# Patient Record
Sex: MALE | Race: WHITE | URBAN - METROPOLITAN AREA
[De-identification: names, ages, dates, MRNs, and addresses within clinical notes are randomized per-mention and may not be internally consistent; named-entity substitution may affect disease eponyms.]

---

## 2021-09-09 ENCOUNTER — OFFICE VISIT (OUTPATIENT)
Dept: FAMILY MEDICINE | Facility: CLINIC | Age: 61
End: 2021-09-09

## 2021-09-09 VITALS
SYSTOLIC BLOOD PRESSURE: 146 MMHG | WEIGHT: 208.7 LBS | HEART RATE: 96 BPM | OXYGEN SATURATION: 97 % | HEIGHT: 68 IN | TEMPERATURE: 97.8 F | DIASTOLIC BLOOD PRESSURE: 79 MMHG | BODY MASS INDEX: 31.63 KG/M2

## 2021-09-09 DIAGNOSIS — N40.0 PROSTATIC ENLARGEMENT: ICD-10-CM

## 2021-09-09 DIAGNOSIS — L03.319 CELLULITIS AND ABSCESS OF TRUNK: Primary | ICD-10-CM

## 2021-09-09 DIAGNOSIS — L02.219 CELLULITIS AND ABSCESS OF TRUNK: Primary | ICD-10-CM

## 2021-09-09 RX ORDER — FLUCONAZOLE 150 MG/1
150 TABLET ORAL ONCE
Qty: 1 TABLET | Refills: 0 | Status: SHIPPED | OUTPATIENT
Start: 2021-09-09 | End: 2021-09-09

## 2021-09-09 RX ORDER — CEPHALEXIN 500 MG/1
500 TABLET ORAL 3 TIMES DAILY
Qty: 15 TABLET | Refills: 0 | Status: SHIPPED | OUTPATIENT
Start: 2021-09-09 | End: 2021-09-14

## 2021-09-09 RX ORDER — CLOTRIMAZOLE 1 %
CREAM (GRAM) TOPICAL 2 TIMES DAILY
Qty: 30 G | Refills: 0 | Status: SHIPPED | OUTPATIENT
Start: 2021-09-09 | End: 2021-09-24

## 2021-09-09 ASSESSMENT — PATIENT HEALTH QUESTIONNAIRE - PHQ9: SUM OF ALL RESPONSES TO PHQ QUESTIONS 1-9: 10

## 2021-09-09 ASSESSMENT — MIFFLIN-ST. JEOR: SCORE: 1731.16

## 2021-09-09 ASSESSMENT — PAIN SCALES - GENERAL: PAINLEVEL: MILD PAIN (2)

## 2021-09-10 NOTE — NURSING NOTE
"BP (!) 146/79 (BP Location: Right arm, Patient Position: Chair, Cuff Size: Adult Regular)   Pulse 96   Temp 97.8  F (36.6  C)   Ht 1.727 m (5' 8\")   Wt 94.7 kg (208 lb 11.2 oz)   SpO2 97%   BMI 31.73 kg/m      BONNIE STARR.  A 61 year old male, presented today with complaint of a boil in the right butt check that has bothered him the past three days. He rates the pain at a 3/10 with itching. Sitting on the sore causes more pain.     On examination it was discovered that boil was 2 cm in diameter, red and slightly raised and that has had a large rash area over most of both buttocks with a defined margin and satellite lesions that Dr. Bautista diagnosed as a yeast/fungus infection.    We discussed that he would be prescribed a clotrimazole cream and on oral 150 tab of fluconazole for the fungal infection and an antibiotic for the boil.     In addition during huddle it was decided that the TF would be encouraged to take his blood pressure more regularly this next month and report back on the results in a follow up in a month to determine if TF does has hypertension.    Student Nurse: Oliva Benitez MN2  "

## 2021-09-10 NOTE — PROGRESS NOTES
"MEDICINE NOTE    SUBJECTIVE:  Flaquito Seals is a 61 year old male with a past medical history of Cellulitis who presents to the clinic for evaluation of skin infection localized to the left buttock that developed 2-3 days ago. Described as an \" annoying, itchy, large pimple\" associated with intermittent soreness and throbbing pain. Pain worsens when sitting down and is relieved with the use of hot compresses. Rated a 3/10 in severity. Current symptoms are similar to the symptoms he experienced 1.5 years ago when diagnosed with cellulitis on his left calf. At that time, patient was given a 2 week course of antibiotics for treatment. He states his apartment complex had an infestation of unspecified bugs however his unit was exterminated in the spring. Denies fever and chills. Patient concerned for cellulitis.    REVIEW OF SYSTEMS:  Gen: no fevers, night sweats or weight change  Eyes: no vision change, diplopia or red eyes  Ears, Noses, Mouth, Throat: Nasal discharge and congestion. No ringing in ears or hearing change, no epistaxis, no oral lesions, throat clear  Cardiac: no chest pain, palpitations, or pain with walking  Lungs: Cough attributed to allergies. No dyspnea or shortness of breath  GI: no nausea, vomiting, diarrhea or constipation, no abdominal pain  : Urinary frequency, no hematuria, or sexual dysfunction  Musculoskeletal: no joint or muscle pain or swelling  Skin: See HPI above.   Neuro: no loss of strength or sensation, no numbness or tingling, no tremor  Endo: no polyuria or polydipsia, no temperature intolerance  Heme/Lymph: no concerning bumps, no bleeding problems  Allergy: no environmental or drug allergies  Psych: Depression and anxiety.    PAST MEDICAL HISTORY:   1. Asthma  2. Depression  3. Anxiety  4. Enlarged Prostate  5. Arthritis  6. Cellulitis    PAST SURGICAL HISTORY: None.     MEDICATIONS:    1. Albuterol inhaler (dose unknown) as needed for asthma   2. Qvar inhaler (dose unknown) " "daily for asthma   3. Tylenol PO (dose unknown) as needed.    ALLERGIES: NKDA    FAMILY HISTORY:  Father: Testicular Cancer, CVA, Arthritis  Mother: No significant medical history.     SOCIAL HISTORY:   Patient lives by himself approximately 2 miles away from the clinic in [facility]. Uses public transportation to get around. Denies having a partner or children.     Caffeine: 2-3 cups of coffee/day.   Tobacco: Denies tobacco use.   Alcohol: Drinks 4-5 ans of beer/week.   Drugs: Denies recreational drug use.     OBJECTIVE:  Physical Exam:  BP (!) 146/79 (BP Location: Right arm, Patient Position: Chair, Cuff Size: Adult Regular)   Pulse 96   Temp 97.8  F (36.6  C)   Ht 1.727 m (5' 8\")   Wt 94.7 kg (208 lb 11.2 oz)   SpO2 97%   BMI 31.73 kg/m    Constitutional: Elderly, obese, no acute distress, ambulatory, slightly disheveled   Eyes: anicteric, normal extra-ocular movements   Ears, Nose and Throat: tympanic membranes clear, nose clear and free of lesions, throat clear, neck supple with full range of motion, no thyromegaly.   Cardiovascular: regular rate and rhythm, normal S1 and S2, heart sounds distant with no murmurs   Respiratory: clear to auscultation, no wheezes or crackles, normal breath sounds    Musculoskeletal: full range of motion, no edema   Skin: Well circumscribed red rash with central erythema on bilateral buttock with healing, slightly raised 2 x 2 cm boil in the center.     Neurological: cranial nerves intact, normal strength and sensation, reflexes at patella and biceps normal, normal gait, no tremor   Psychological: appropriate mood   Lymphatic: no cervical  lymphadenopathy    ASSESSMENT/PLAN:  Flaquito Seals is a 61 year old male with a past medical history of Cellulitis who presents to the clinic for evaluation of a pruritic boil on the left buttock for 2-3 days associated with a well circumscribed red rash with central erythema on examination.     Patient advised on daily blood " pressure monitoring as blood pressure noted to be elevated at 153/84 mmHg.     Discussed with patient the likely diagnosis of a bacterial infection (Staph A. vs. Streptococci) as well as a fungal infection.     Patient provided with cephalexin 500 mg tablet PO for 5 days, one dose of fluconazole 150 mg tablet, and clotrimazole 1% topical cream for 15 days.    Repeat BP: 146/79 mmHg.     Shahab was seen today for lesion.    Diagnoses and all orders for this visit:    Cellulitis and abscess of trunk  -     cephalexin 500 MG tablet; Take 500 mg by mouth 3 times daily for 5 days  -     fluconazole (DIFLUCAN) 150 MG tablet; Take 1 tablet (150 mg) by mouth once for 1 dose  -     clotrimazole (LOTRIMIN) 1 % external cream; Apply topically 2 times daily for 15 days        Med Clinician: Fartun Castillo MS2  Preceptor: Stefano Bautista MD    In supervising the medical student, Fartun Castillo, I repeated the exam documented above. Patient with cellulitis originating from boil on r buttox.  Also  Cutaneous rash over both buttox consistent with yeast.  Short course antibiotics, oral / cutaeous tx for yeast. I have reviewed and verified the student s documentation.  Supervising Provider: Stefano Bautista MD

## 2021-09-10 NOTE — PATIENT INSTRUCTIONS
1. Take one tablet of Cefalexin 500 mg 3 times daily for 5 days  2. Take one tablet of Fluconozole 150 mg   3. Apply Cotrimoxazole 1% topical cream to affected area twice daily

## 2021-09-13 ENCOUNTER — TELEPHONE (OUTPATIENT)
Dept: FAMILY MEDICINE | Facility: CLINIC | Age: 61
End: 2021-09-13

## 2021-09-13 NOTE — TELEPHONE ENCOUNTER
----- Message from Citlali Nash sent at 2021 10:08 PM CDT -----  Regardin2021, English  Summary: Shahab presented to the clinic 21 with a bacterial and fungal infection on his buttock. He was prescribed 15 cephalexin 500mg tablets, taking it TID for 5 days; 1 fluconazole 150mg tablet, and clotrimazole 1% cream.    Plan:  1. Initiate the cephalexin, fluconazole, and clotrimazole for the infections  2. Soak abscess in warm water daily  3. Monitor infection for any changes, if there is no improvement in 1 week, return to [facility]    What to monitor for:  Cephalexin:  -Efficacy: decreased size of abscess and erythema around it   -Safety: adverse effects include diarrhea, allergic reaction    Fluconazole  -Efficacy: decreased redness and bumps of fungal rash  -Safety: monitor for headache after dose    Clotrimazole  -Efficacy: decreased redness and bumps of fungal rash  -Safety: skin irritation

## 2021-09-13 NOTE — TELEPHONE ENCOUNTER
----- Message from Citlali Nash sent at 2021 10:08 PM CDT -----  Regardin2021, English  Summary: Shahab presented to the clinic 21 with a bacterial and fungal infection on his buttock. He was prescribed 15 cephalexin 500mg tablets, taking it TID for 5 days; 1 fluconazole 150mg tablet, and clotrimazole 1% cream.    Plan:  1. Initiate the cephalexin, fluconazole, and clotrimazole for the infections  2. Soak abscess in warm water daily  3. Monitor infection for any changes, if there is no improvement in 1 week, return to [facility]    What to monitor for:  Cephalexin:  -Efficacy: decreased size of abscess and erythema around it   -Safety: adverse effects include diarrhea, allergic reaction    Fluconazole  -Efficacy: decreased redness and bumps of fungal rash  -Safety: monitor for headache after dose    Clotrimazole  -Efficacy: decreased redness and bumps of fungal rash  -Safety: skin irritation

## 2021-11-01 ENCOUNTER — OFFICE VISIT (OUTPATIENT)
Dept: FAMILY MEDICINE | Facility: CLINIC | Age: 61
End: 2021-11-01

## 2021-11-01 VITALS
HEIGHT: 68 IN | TEMPERATURE: 98.1 F | HEART RATE: 84 BPM | SYSTOLIC BLOOD PRESSURE: 127 MMHG | BODY MASS INDEX: 32.28 KG/M2 | WEIGHT: 213 LBS | OXYGEN SATURATION: 97 % | RESPIRATION RATE: 16 BRPM | DIASTOLIC BLOOD PRESSURE: 75 MMHG

## 2021-11-01 DIAGNOSIS — M19.90 ARTHRITIS: ICD-10-CM

## 2021-11-01 DIAGNOSIS — M16.10 ARTHRITIS OF HIP: Primary | ICD-10-CM

## 2021-11-01 RX ORDER — ACETAMINOPHEN 500 MG
500-1000 TABLET ORAL EVERY 6 HOURS PRN
Qty: 240 TABLET | Refills: 0 | Status: SHIPPED | OUTPATIENT
Start: 2021-11-01 | End: 2021-12-01

## 2021-11-01 RX ORDER — IBUPROFEN 200 MG
600 TABLET ORAL EVERY 8 HOURS PRN
Qty: 270 TABLET | Refills: 0 | Status: SHIPPED | OUTPATIENT
Start: 2021-11-01

## 2021-11-01 ASSESSMENT — MIFFLIN-ST. JEOR: SCORE: 1750.66

## 2021-11-01 ASSESSMENT — PAIN SCALES - GENERAL: PAINLEVEL: MILD PAIN (3)

## 2021-11-02 NOTE — PATIENT INSTRUCTIONS
We believe that your pain is stemming from your previously diagnosed arthritis. The icy hot and tylenol have been temporarily helping you, but physical therapy would be a more permanent solution.    We will be prescribing you 500 mg tylenol tablets. You are able to take up to 4000 mg a day, but no more than two pills every six hours as needed.    We have given you a Physical Therapy fast pass, and we recommend you come back any Monday or Thursday between 6 PM - 8 PM to receive treatment.

## 2021-11-02 NOTE — PROGRESS NOTES
"MEDICINE NOTE    SUBJECTIVE:  Mr. Shahab Seals is a 61 year old male who presents to clinic for right shoulder and left hip pain. His left hip pain started last month, described as stiff and sharp pain in the mornings that improves throughout the day. It gets worse with L hip flexion and is a bit better while walking. He sleeps in an EZ chair at night and notes the pain sometimes wakes him up and night. Right shoulder pain began last year and is most noticeable while the R shoulder and elbow are both flexed, like a \"chicken wing\". Both areas of pain are rated a 5/10 at their worst, and do not radiate anywhere else on his body. He takes one 500 mg tablet of Tylenol 4x daily to help with the pain when needed and also uses Icy Hot. He notes he has a 15 year history of arthritis and has taken a few falls, the most recent being last April, but that he does not know if the falls specifically triggered the current  pain given timing of their onset. He denies any fever chills, weight loss, or numbness/tingling in extremities.    REVIEW OF SYSTEMS:  Gen: no fevers, chills, night sweats or weight change  Musculoskeletal: shoulder and hip pain (see HPI), ongoing hx of arthritis  Skin: no concerning lesions or rashes  Neuro: no dizziness, numbness, or tingling    Past Medical History:   Diagnosis Date     Arthritis      Asthma      Depression      Fainting spell 5/9/14     Prostatic enlargement 9/9/2021     Syncope, unspecified syncope type 8/30/2015       No past surgical history on file.    No family history on file.    Social History     Socioeconomic History     Marital status: Single     Spouse name: Not on file     Number of children: Not on file     Years of education: Not on file     Highest education level: Not on file   Occupational History     Not on file   Tobacco Use     Smoking status: Never Smoker     Smokeless tobacco: Never Used   Substance and Sexual Activity     Alcohol use: Yes     Alcohol/week: 4.0 " standard drinks     Types: 4 Cans of beer per week     Comment: 2-3 drinks 3 times per week     Drug use: No     Sexual activity: Never   Other Topics Concern     Parent/sibling w/ CABG, MI or angioplasty before 65F 55M? Not Asked   Social History Narrative    Date of Service:10/10/2012     Name: Shahab MAYES (Month, Day, Year of birth): 1960     MRN: [id number]     New Patient: No    Preferred Language: English     Needed: No    County of Residence: [place]    Marital Status: Single    Household size: 1    Number of Dependents: 0    Pregnant: N/A    Average Monthly Income: $1,100-$1,800 per month    Citizenship Status: [place] citizen            Applied for AA about 6 months ago, will give them another call. Unemployed, benefits ran out in September. Was getting $800 before. Not interested in [place] [facility] due to application length. -TP.         MTD    Still no report from AA. This time, took form for [facility], interested. Given info for food assistance. Utilities are covered by [employer]. Would be interested in rental assistance...        13 Patient followed up with AA a couple weeks ago but still no response. Did not end up filling out the [place] [facility] application because he felt it was too long with too much paperwork. Not interested in receiving another [place] [facility] application. Will follow up with AA again soon. SSJ        13: He still hasn't heard back from assured access and he said that he was going to try and follow-up again. We are going to email the CHW coordinator and receive more specific information about how long it takes for patients to hear if they are accepted or not. Also, make sure the numbers we are giving the patients to follow-up with AA is correct. -KLB        4/10/13    Patient received a [place] to take [place] to fill out. He still has not heard back from assured access and he will try to give them a call again. Patient may fill out another assured access  application if he did not receive any response in the future.-         -    Still waiting to hear back.         13    Patient still has not heard back from AA -- says it's been about 6 months. Did not end up completing his [place] application because of the amount of paperwork involved. I advised that he tackle a page a day, or bring in his application materials the next time he's here for a med re-fill so that a CHW can fill out the form with him. He seemed somewhat open to this idea. One concern he had with the [place] [facility] application was that his employment is sporadic; he works 35 hours/week now but won't have employment from July until November. He also received the AA number so he can try calling again to inquire about his application status. [facility]        Date of Service:2013     Name: Shahab MAYES (Month, Day, Year of birth): 1960     MRN: [id number]     New Patient: No    Preferred Language: English     Needed: No    County of Residence: [place]    Marital Status: Single    Household size: 1    Number of Dependents: 0    Pregnant:No    Average Monthly Income: $ 0    Citizenship Status: [place] Citizen    2013: He has still not contacted AA access about his application status because he got side tracked.  I gave him the phone number and another form just in case.        13. Patient still has not contacted AA because he has been distracted. He requested Rental Assistance resources and since I could not find the form (I think the binder is being updated), I found a list of resources online and gave them to him. [facility]        10/2/2013    Hasn't called AA but is planning on calling them sometime soon        2013:     Hasn't called AA. CHW gave him the AA number. He has not utilized any of the Rental Assistance resources given to him on 13 but he still has the form in case he needs rental assistance later. [facility]        2013- Gave patient AA forms again to fill  out. He still has Rental Assistance resources but isn't sure if he is eligible anymore because he has spontaneous income. ES        12/30/2013    No need to see CHW        3/3/14    Did not need to see CHW. EAL        5/12/2014        The patient stated that he recently had a trip to the emergency department and is looking to get insurance to help cover these costs. I gave him the website to [facility] and gave him an application to look at but told him that he should print an application from the website. Please check with him in the future to see if he applied and how the status of his application. HT        6/11/2014        10/29/2015    The patient now has insurance through [employer]. He requested information on dental services and we referred him to [facility]. He had no other questions. Follow up on dental status for next visit.         12/10/2015    Followed up on dental status, has not gone to dental clinic since the last time he was here. Did not have any additional questions for CHW.        2/15/2016    The patient did not have any concerns. He is still covered under [employer] and is currently employment. -TJ        11/1/2021    Patient mentioned that they had insurance but was not paying premiums. Provided information about [facility] for dental, primary care. Mentioned that they were okay with sliding scale. -CO                 Social Determinants of Health     Financial Resource Strain:      Difficulty of Paying Living Expenses:    Food Insecurity:      Worried About Running Out of Food in the Last Year:      Ran Out of Food in the Last Year:    Transportation Needs:      Lack of Transportation (Medical):      Lack of Transportation (Non-Medical):    Physical Activity:      Days of Exercise per Week:      Minutes of Exercise per Session:    Stress:      Feeling of Stress :    Social Connections:      Frequency of Communication with Friends and Family:      Frequency of Social Gatherings with Friends  "and Family:      Attends Yazidi Services:      Active Member of Clubs or Organizations:      Attends Club or Organization Meetings:      Marital Status:    Intimate Partner Violence:      Fear of Current or Ex-Partner:      Emotionally Abused:      Physically Abused:      Sexually Abused:        OBJECTIVE:  Physical Exam:  /75 (BP Location: Right arm, Patient Position: Sitting)   Pulse 84   Temp 98.1  F (36.7  C) (Oral)   Resp 16   Ht 1.727 m (5' 8\")   Wt 96.6 kg (213 lb)   SpO2 97%   BMI 32.39 kg/m    Constitutional: no distress, comfortable, pleasant   Musculoskeletal: full range of motion, no paraspinal or midline tenderness, positive empty can on R arm, negative on L arm  Neurological: strength and sensation intact  Psychological: appropriate mood     ASSESSMENT/PLAN:  Shahab was seen today for one month of L hip pain and one year of R shoulder pain. Given his 15 year history of arthritis and lack of infectious or neurological symptoms, it is most likely that his hip and shoulder pain are also arthritic. Plan is to prescribe acetaminophen and ibuprofen PRN, and begin physical therapy for muscle strengthening. Physical Therapy Fast Pass was given to patient.    Diagnoses and all orders for this visit:    Arthritis of hip  -     acetaminophen (TYLENOL) 500 MG tablet; Take 1-2 tablets (500-1,000 mg) by mouth every 6 hours as needed for mild pain (pain)  -     ibuprofen (ADVIL/MOTRIN) 200 MG tablet; Take 3 tablets (600 mg) by mouth every 8 hours as needed for mild pain    Arthritis  -     acetaminophen (TYLENOL) 500 MG tablet; Take 1-2 tablets (500-1,000 mg) by mouth every 6 hours as needed for mild pain (pain)  -     ibuprofen (ADVIL/MOTRIN) 200 MG tablet; Take 3 tablets (600 mg) by mouth every 8 hours as needed for mild pain    Asthma        Med Clinician: Beatriz Palmer  Preceptor: Orion Rachel    I am signing this for the preceptor who has been unable to sign this note in a timely manner.  " The content of this note has been reviewed by the preceptor for accuracy.  I did not participate in the care of this patient.  Ilan London MD - Medical Director, [facility]

## 2021-11-02 NOTE — PROGRESS NOTES
"[facility] Progress Note    Chief complaint: Pain of right shoulder and left hip    Last date of full med: 11/1/2021    Subjective:  Patient reports pain of the right shoulder and the left hip. Pain in the right shoulder is only present when the elbow is bent. The pain does not radiate and there is no numbness or tingling. Patient states that pain is sharp and at worst a pain scale of 5/10. Average pain scale of 2-3/10. The pain is worst in the morning but gets better throughout the day and with moving. Patient reports using Icy Hot and that helps the pain occasionally. Patient diagnosed with arthritis 15 years ago. Pain tonight is consistent with arthritis, physical assessment was done.       Current Outpatient Medications   Medication Sig Dispense Refill     ibuprofen (ADVIL/MOTRIN) 200 MG tablet Take 3 tablets (600 mg) by mouth every 8 hours as needed for mild pain 270 tablet 0     acetaminophen (TYLENOL) 500 MG tablet Take 1-2 tablets (500-1,000 mg) by mouth every 6 hours as needed for mild pain (pain) 240 tablet 0         Objective:  /75 (BP Location: Right arm, Patient Position: Sitting)   Pulse 84   Temp 98.1  F (36.7  C) (Oral)   Resp 16   Ht 1.727 m (5' 8\")   Wt 96.6 kg (213 lb)   SpO2 97%   BMI 32.39 kg/m        Assessment:     Indication  DTP: Needs additional drug therapy  Rationale: Patient needs additional drug therapy due to untreated hip and shoulder pain      Indication  DTP: Needs additional drug therapy  Rationale: Patient needs additional drug therapy for untreated hip and shoulder pain.      Plan:  1. Patient will take 1-2 tablets of tylenol when pain occurs and 3 tablets of ibuprofen for any breakthrough pain.  2. Patient will continue to use Icy Hot to help with pain.  3. Patient was given PT fast pass to use to strengthen muscle weakness and improve joint stability.    Follow-Up:  Patient will follow up in person within the next two weeks and use PT fast pass to get an " assessment and exercises to decrease shoulder and hip pain. Will also assess IESC of tylenol and ibuprofen. Make sure that they are controlling the pain without side effects such as upset stomach, itching, nausea, or black/bloody stools.    Pharmacy Clinician: Suki Hernandez  Pharmacy Preceptor: Julio Cesar Camejo Pharm.D.      _____________________________  Preceptor Use Only:  In supervising the student, I have reviewed and verified the student's documentation and found it to be correct and complete.   Preceptor Signature: Julio Cesar Camejo Pharm.D.

## 2021-11-02 NOTE — PROGRESS NOTES
"[facility] Nursing Progress Note    Chief complaint: Hip and shoulder pain      Onset: hip pain started in last month, shoulder pain started in last year  Location: hip (left outer/inner), shoulder (right upper, with movement)  Duration, timing: shoulder (hurts with movement and bending), hip (ok with walking but painful in the morning).  Severity (0-10): Shoulder 4/10 at worst, hip 3/10 while walking and 5/10 in the morning.    Other: patient had a few falls in the last year which he attributes to not watching where he was going, thinks the shoulder pain may be associated with the fall, not sure where hip pain is from. Possibly arthritis which he has had for the past 15 years, taking tylenol and applying icy/hot.  Patient would like to get a fastpass for PT to come on another night, not sure when he can come back.      Objective:  /75 (BP Location: Right arm, Patient Position: Sitting)   Pulse 84   Temp 98.1  F (36.7  C) (Oral)   Resp 16   Ht 1.727 m (5' 8\")   Wt 96.6 kg (213 lb)   SpO2 97%   BMI 32.39 kg/m        Social History:  Caffeine: about 3 cups of coffee per day  Tobacco: no  Alcohol: yes- 6 drinks/week. 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  Physical Activity/Lifestyle: try to walk to the grocery store and around to keep his hips moving.      PHQ Score:    PHQ2: 2     PHQ9: n/a      Nursing Clinician: Alessandra Navarro  Nursing Preceptor: Ame Kiser NP    _____________________________  Preceptor Use Only:  In supervising the student, I have reviewed and verified the student's documentation and found it to be correct and complete.   Preceptor Signature: Ame Kiser NP  " Family Declined

## 2022-01-09 NOTE — PROGRESS NOTES
[facility] Progress Note    Chief complaint: Possible skin infection    Last date of full med: 2/15/2016    Subjective:  Shahab is a 61 year old man that presented to the clinic inquiring about a possible skin infection on his left buttock. He has a history of cellulitis and states that this infection looks similar to ones he has had in the past. Shahab notes that a red, inflamed pimple appeared on his skin 2-3 days ago. He has been soaking in warm water daily, and has not been able to drain the abscess. While examining the skin infection, it was also determined that Shahab had a fungal infection around his buttock. Shahab has no known drug allergies. He has a history of cellulitis, asthma, depression, anxiety, and BPH, but does not take medications for these anymore due to losing his medical insurance. He was previously taking Sertraline 100mg daily, Qvar 80mcg/ ACT, Ventolin, and Tamsulosin 0.4mg. He states that he occasionally takes acetaminophen for minor ailments. He drinks about 2-3 cups of coffee daily and about 4-5 beers per week. No tobacco or illicit drug use. Shahab received his COVID-19 and flu vaccine this year.       Current Outpatient Medications   Medication Sig Dispense Refill     acetaminophen (TYLENOL) 500 MG tablet Take 1 tablet (500 mg) by mouth every 6 hours as needed for pain 90 tablet 0     cephalexin 500 MG tablet Take 500 mg by mouth 3 times daily for 5 days 15 tablet 0     clotrimazole (LOTRIMIN) 1 % external cream Apply topically 2 times daily for 15 days 30 g 0     albuterol (PROAIR HFA, PROVENTIL HFA, VENTOLIN HFA) 108 (90 BASE) MCG/ACT inhaler Inhale 2 puffs into the lungs every 6 hours as needed for shortness of breath / dyspnea (Patient not taking: Reported on 9/9/2021) 1 Inhaler 0     beclomethasone (QVAR) 80 MCG/ACT Inhaler Inhale 2 puffs into the lungs 2 times daily (Patient not taking: Reported on 9/9/2021) 1 Inhaler 0     sertraline (ZOLOFT) 100 MG tablet Take one  "tablet (100 mg) by mouth daily (Patient not taking: Reported on 9/9/2021) 30 tablet 0   note: patient is no longer taking Sertraline, Ventolin, and Qvar.      Objective:  BP (!) 146/79 (BP Location: Right arm, Patient Position: Chair, Cuff Size: Adult Regular)   Pulse 96   Temp 97.8  F (36.6  C)   Ht 1.727 m (5' 8\")   Wt 94.7 kg (208 lb 11.2 oz)   SpO2 97%   BMI 31.73 kg/m          Assessment:     Condition: Skin infection  DTP: Needs additional drug therapies  Rationale: Due to his bacterial and fungal infection on his skin, an antibiotic and antifungal medication are indicated.      Plan:  1. Initiate cephalexin 500mg tablets by mouth TID for 5 days  2. Initiate fluconazole 150mg mg by mouth for 1 dose  3. Initiate topical Lotrimin 1% cream, apply to affected area twice daily      Follow-Up:  Monitor the abscess size and color, as well as the fungal rash. If the infection does not improve in the next week, return to the clinic.     Pharmacy Clinician: Citlali Nash  Pharmacy Preceptor: Maggie Villalta    ____________________________  Preceptor Use Only:  In supervising the student, I have reviewed and verified the student's documentation and found it to be correct and complete.  Preceptor Signature: Maggie Villalta, PharmD - September 13, 2021      " 130

## 2023-09-08 ENCOUNTER — LAB REQUISITION (OUTPATIENT)
Dept: LAB | Facility: CLINIC | Age: 63
End: 2023-09-08
Payer: COMMERCIAL

## 2023-09-08 DIAGNOSIS — R19.8 OTHER SPECIFIED SYMPTOMS AND SIGNS INVOLVING THE DIGESTIVE SYSTEM AND ABDOMEN: ICD-10-CM

## 2023-09-08 PROCEDURE — 80053 COMPREHEN METABOLIC PANEL: CPT | Mod: ORL | Performed by: NURSE PRACTITIONER

## 2023-09-08 PROCEDURE — 87209 SMEAR COMPLEX STAIN: CPT | Mod: ORL | Performed by: NURSE PRACTITIONER

## 2023-09-08 PROCEDURE — 87507 IADNA-DNA/RNA PROBE TQ 12-25: CPT | Mod: ORL | Performed by: NURSE PRACTITIONER

## 2023-09-09 LAB
ADV 40+41 DNA STL QL NAA+NON-PROBE: NEGATIVE
ALBUMIN SERPL BCG-MCNC: 4.7 G/DL (ref 3.5–5.2)
ALP SERPL-CCNC: 44 U/L (ref 40–129)
ALT SERPL W P-5'-P-CCNC: 16 U/L (ref 0–70)
ANION GAP SERPL CALCULATED.3IONS-SCNC: 14 MMOL/L (ref 7–15)
AST SERPL W P-5'-P-CCNC: 21 U/L (ref 0–45)
ASTRO TYP 1-8 RNA STL QL NAA+NON-PROBE: NEGATIVE
BILIRUB SERPL-MCNC: 0.6 MG/DL
BUN SERPL-MCNC: 11 MG/DL (ref 8–23)
C CAYETANENSIS DNA STL QL NAA+NON-PROBE: NEGATIVE
CALCIUM SERPL-MCNC: 9.7 MG/DL (ref 8.8–10.2)
CAMPYLOBACTER DNA SPEC NAA+PROBE: NEGATIVE
CHLORIDE SERPL-SCNC: 101 MMOL/L (ref 98–107)
CREAT SERPL-MCNC: 0.78 MG/DL (ref 0.67–1.17)
CRYPTOSP DNA STL QL NAA+NON-PROBE: NEGATIVE
DEPRECATED HCO3 PLAS-SCNC: 23 MMOL/L (ref 22–29)
E COLI O157 DNA STL QL NAA+NON-PROBE: NORMAL
E HISTOLYT DNA STL QL NAA+NON-PROBE: NEGATIVE
EAEC ASTA GENE ISLT QL NAA+PROBE: NEGATIVE
EC STX1+STX2 GENES STL QL NAA+NON-PROBE: NEGATIVE
EGFRCR SERPLBLD CKD-EPI 2021: >90 ML/MIN/1.73M2
EPEC EAE GENE STL QL NAA+NON-PROBE: NEGATIVE
ETEC LTA+ST1A+ST1B TOX ST NAA+NON-PROBE: NEGATIVE
G LAMBLIA DNA STL QL NAA+NON-PROBE: NEGATIVE
GLUCOSE SERPL-MCNC: 82 MG/DL (ref 70–99)
NOROVIRUS GI+II RNA STL QL NAA+NON-PROBE: NEGATIVE
P SHIGELLOIDES DNA STL QL NAA+NON-PROBE: NEGATIVE
POTASSIUM SERPL-SCNC: 4.3 MMOL/L (ref 3.4–5.3)
PROT SERPL-MCNC: 7.5 G/DL (ref 6.4–8.3)
RVA RNA STL QL NAA+NON-PROBE: NEGATIVE
SALMONELLA SP RPOD STL QL NAA+PROBE: NEGATIVE
SAPO I+II+IV+V RNA STL QL NAA+NON-PROBE: NEGATIVE
SHIGELLA SP+EIEC IPAH ST NAA+NON-PROBE: NEGATIVE
SODIUM SERPL-SCNC: 138 MMOL/L (ref 136–145)
V CHOLERAE DNA SPEC QL NAA+PROBE: NEGATIVE
VIBRIO DNA SPEC NAA+PROBE: NEGATIVE
Y ENTEROCOL DNA STL QL NAA+PROBE: NEGATIVE

## 2023-09-11 ENCOUNTER — TRANSCRIBE ORDERS (OUTPATIENT)
Dept: GASTROENTEROLOGY | Facility: CLINIC | Age: 63
End: 2023-09-11
Payer: COMMERCIAL

## 2023-09-11 DIAGNOSIS — R19.8 FULLNESS OF ABDOMEN: Primary | ICD-10-CM

## 2023-09-11 LAB — O+P STL MICRO: NEGATIVE

## 2023-09-12 ENCOUNTER — LAB REQUISITION (OUTPATIENT)
Dept: LAB | Facility: CLINIC | Age: 63
End: 2023-09-12
Payer: COMMERCIAL

## 2023-09-12 DIAGNOSIS — R19.8 OTHER SPECIFIED SYMPTOMS AND SIGNS INVOLVING THE DIGESTIVE SYSTEM AND ABDOMEN: ICD-10-CM

## 2023-09-12 PROCEDURE — 87209 SMEAR COMPLEX STAIN: CPT | Mod: ORL | Performed by: NURSE PRACTITIONER

## 2023-09-13 LAB — O+P STL MICRO: NEGATIVE

## 2023-09-20 ENCOUNTER — TELEPHONE (OUTPATIENT)
Dept: GASTROENTEROLOGY | Facility: CLINIC | Age: 63
End: 2023-09-20
Payer: COMMERCIAL

## 2023-09-20 NOTE — TELEPHONE ENCOUNTER
"Endoscopy Scheduling Screen    Have you had a positive Covid test in the last 14 days?  No    Are you active on MyChart?   No    What insurance is in the chart?  Other:  [employer]    Ordering/Referring Provider: IRAM TIPTON   (If ordering provider performs procedure, schedule with ordering provider unless otherwise instructed. )    BMI: Estimated body mass index is 32.39 kg/m  as calculated from the following:    Height as of 11/1/21: 1.727 m (5' 8\").    Weight as of 11/1/21: 96.6 kg (213 lb).     Sedation Ordered  moderate sedation.   If patient BMI > 50 do not schedule in [facility].    If patient BMI > 45 do not schedule at [facility].    Are you taking methadone or Suboxone?  No    Are you taking any prescription medications for pain 3 or more times per week?   No    Do you have a history of malignant hyperthermia or adverse reaction to anesthesia?  No    (Females) Are you currently pregnant?   No     Have you been diagnosed or told you have pulmonary hypertension?   No    Do you have an LVAD?  No    Have you been told you have moderate to severe sleep apnea?  No    Have you been told you have COPD, asthma, or any other lung disease?  Yes     What breathing problems do you have?  Asthma     Do you use home oxygen?  No    Have your breathing problems required an ED visit or hospitalization in the last year?  No    Do you have any heart conditions?  No     Have you ever had an organ transplant?   No    Have you ever had or are you awaiting a heart or lung transplant?   No    Have you had a stroke or transient ischemic attack (TIA aka \"mini stroke\" in the last 6 months?   No    Have you been diagnosed with or been told you have cirrhosis of the liver?   No    Are you currently on dialysis?   No    Do you need assistance transferring?   No    BMI: Estimated body mass index is 32.39 kg/m  as calculated from the following:    Height as of 11/1/21: 1.727 m (5' 8\").    Weight as of 11/1/21: 96.6 kg (213 lb).     Is patients BMI > " 40 and scheduling location [facility]?  No    Do you take an injectable medication for weight loss or diabetes (excluding insulin)?  No    Do you take the medication Naltrexone?  No    Do you take blood thinners?  No       Prep   Are you currently on dialysis or do you have chronic kidney disease?  No    Do you have a diagnosis of diabetes?  No    Do you have a diagnosis of cystic fibrosis (CF)?  No    On a regular basis do you go 3 -5 days between bowel movements?  No    BMI > 40?  No    Preferred Pharmacy:  No Pharmacies Listed    Final Scheduling Details   Colonoscopy prep sent?  Standard MiraLAX    Procedure scheduled  Colonoscopy    Surgeon:  BERNARD     Date of procedure:  10/9/23     Pre-OP / PAC:   No - Not required for this site.    Location  [facility] - Per order.    Sedation   Moderate Sedation - Per order.      Patient Reminders:   You will receive a call from a Nurse to review instructions and health history.  This assessment must be completed prior to your procedure.  Failure to complete the Nurse assessment may result in the procedure being cancelled.      On the day of your procedure, please designate an adult(s) who can drive you home stay with you for the next 24 hours. The medicines used in the exam will make you sleepy. You will not be able to drive.      You cannot take public transportation, ride share services, or non-medical taxi service without a responsible caregiver.  Medical transport services are allowed with the requirement that a responsible caregiver will receive you at your destination.  We require that drivers and caregivers are confirmed prior to your procedure.

## 2023-09-26 ENCOUNTER — TELEPHONE (OUTPATIENT)
Dept: GASTROENTEROLOGY | Facility: CLINIC | Age: 63
End: 2023-09-26
Payer: COMMERCIAL

## 2023-09-26 NOTE — TELEPHONE ENCOUNTER
Attempted to contact patient in order to complete pre assessment questions.     The 952 and 612 numbers listed in chart are not in service. The 715 number, writer was told by the person who answered, is the wrong number.       Procedure details:    Patient scheduled for Colonoscopy  on 10.9.23.     Arrival time: 0845. Procedure time 0945    Pre op exam needed? N/A    [facility] [address] [place] [address], [address] Floor, [place] [address]    Sedation type: Conscious sedation     Indication for procedure: fullness of abdomen      Chart review:     Electronic implanted devices? No    Diabetic? No        Medication review:    Anticoagulants? No    NSAIDS? Yes.  Ibuprofen (Advil, Motrin).  Holding interval of 1 day before procedure.    Other medication HOLDING recommendations:  N/A      Prep for procedure:     Bowel prep recommendation: Standard Miralax  Due to: standard bowel prep.    Prep instructions sent via letter.     Mariann Benavides RN  Endoscopy Procedure Pre Assessment RN

## 2023-09-26 NOTE — TELEPHONE ENCOUNTER
Pre assessment completed for upcoming procedure.   (Please see previous telephone encounter notes for complete details)    Patient  returned call.       Procedure details:    Arrival time and facility location reviewed.    Pre op exam needed? N/A    Designated  policy reviewed. Instructed to have someone stay 6 hours post procedure.     COVID policy reviewed.      Medication review:    Fiber supplements, stop 7 days prior to procedure      Prep for procedure:     Procedure prep instructions reviewed.        Additional information needed?  N/A      Patient  verbalized understanding and had no questions or concerns at this time.      Mariann Benavides RN  Endoscopy Procedure Pre Assessment RN  [phone] option 4

## 2023-10-06 ENCOUNTER — LAB REQUISITION (OUTPATIENT)
Dept: LAB | Facility: CLINIC | Age: 63
End: 2023-10-06
Payer: COMMERCIAL

## 2023-10-06 DIAGNOSIS — Z00.00 ENCOUNTER FOR GENERAL ADULT MEDICAL EXAMINATION WITHOUT ABNORMAL FINDINGS: ICD-10-CM

## 2023-10-06 PROCEDURE — 82306 VITAMIN D 25 HYDROXY: CPT | Mod: ORL | Performed by: NURSE PRACTITIONER

## 2023-10-06 PROCEDURE — 80061 LIPID PANEL: CPT | Mod: ORL | Performed by: NURSE PRACTITIONER

## 2023-10-06 PROCEDURE — 84443 ASSAY THYROID STIM HORMONE: CPT | Mod: ORL | Performed by: NURSE PRACTITIONER

## 2023-10-06 PROCEDURE — G0103 PSA SCREENING: HCPCS | Mod: ORL | Performed by: NURSE PRACTITIONER

## 2023-10-07 LAB
CHOLEST SERPL-MCNC: 222 MG/DL
HDLC SERPL-MCNC: 54 MG/DL
LDLC SERPL CALC-MCNC: 138 MG/DL
NONHDLC SERPL-MCNC: 168 MG/DL
PSA SERPL DL<=0.01 NG/ML-MCNC: 2.26 NG/ML (ref 0–4.5)
TRIGL SERPL-MCNC: 148 MG/DL
TSH SERPL DL<=0.005 MIU/L-ACNC: 2.09 UIU/ML (ref 0.3–4.2)
VIT D+METAB SERPL-MCNC: 23 NG/ML (ref 20–50)

## 2023-10-09 ENCOUNTER — HOSPITAL ENCOUNTER (OUTPATIENT)
Facility: AMBULATORY SURGERY CENTER | Age: 63
Discharge: HOME | End: 2023-10-09
Attending: INTERNAL MEDICINE | Admitting: INTERNAL MEDICINE
Payer: COMMERCIAL

## 2023-10-09 VITALS
WEIGHT: 190 LBS | TEMPERATURE: 97 F | HEART RATE: 79 BPM | BODY MASS INDEX: 28.79 KG/M2 | RESPIRATION RATE: 16 BRPM | SYSTOLIC BLOOD PRESSURE: 107 MMHG | DIASTOLIC BLOOD PRESSURE: 63 MMHG | OXYGEN SATURATION: 97 % | HEIGHT: 68 IN

## 2023-10-09 LAB — COLONOSCOPY: NORMAL

## 2023-10-09 PROCEDURE — 45385 COLONOSCOPY W/LESION REMOVAL: CPT | Performed by: INTERNAL MEDICINE

## 2023-10-09 PROCEDURE — 88305 TISSUE EXAM BY PATHOLOGIST: CPT | Mod: 26 | Performed by: PATHOLOGY

## 2023-10-09 PROCEDURE — 88305 TISSUE EXAM BY PATHOLOGIST: CPT | Mod: TC | Performed by: INTERNAL MEDICINE

## 2023-10-09 RX ORDER — LIDOCAINE 40 MG/G
CREAM TOPICAL
Status: DISCONTINUED | OUTPATIENT
Start: 2023-10-09 | End: 2023-10-09 | Stop reason: HOSPADM

## 2023-10-09 RX ORDER — FENTANYL CITRATE 50 UG/ML
INJECTION, SOLUTION INTRAMUSCULAR; INTRAVENOUS DAILY PRN
Status: DISCONTINUED | OUTPATIENT
Start: 2023-10-09 | End: 2023-10-09 | Stop reason: HOSPADM

## 2023-10-09 RX ORDER — NALOXONE HYDROCHLORIDE 0.4 MG/ML
0.4 INJECTION, SOLUTION INTRAMUSCULAR; INTRAVENOUS; SUBCUTANEOUS
Status: DISCONTINUED | OUTPATIENT
Start: 2023-10-09 | End: 2023-10-10 | Stop reason: HOSPADM

## 2023-10-09 RX ORDER — ONDANSETRON 2 MG/ML
4 INJECTION INTRAMUSCULAR; INTRAVENOUS
Status: DISCONTINUED | OUTPATIENT
Start: 2023-10-09 | End: 2023-10-09 | Stop reason: HOSPADM

## 2023-10-09 RX ORDER — ONDANSETRON 2 MG/ML
4 INJECTION INTRAMUSCULAR; INTRAVENOUS EVERY 6 HOURS PRN
Status: DISCONTINUED | OUTPATIENT
Start: 2023-10-09 | End: 2023-10-10 | Stop reason: HOSPADM

## 2023-10-09 RX ORDER — NALOXONE HYDROCHLORIDE 0.4 MG/ML
0.2 INJECTION, SOLUTION INTRAMUSCULAR; INTRAVENOUS; SUBCUTANEOUS
Status: DISCONTINUED | OUTPATIENT
Start: 2023-10-09 | End: 2023-10-10 | Stop reason: HOSPADM

## 2023-10-09 RX ORDER — PROCHLORPERAZINE MALEATE 10 MG
10 TABLET ORAL EVERY 6 HOURS PRN
Status: DISCONTINUED | OUTPATIENT
Start: 2023-10-09 | End: 2023-10-10 | Stop reason: HOSPADM

## 2023-10-09 RX ORDER — ONDANSETRON 4 MG/1
4 TABLET, ORALLY DISINTEGRATING ORAL EVERY 6 HOURS PRN
Status: DISCONTINUED | OUTPATIENT
Start: 2023-10-09 | End: 2023-10-10 | Stop reason: HOSPADM

## 2023-10-09 RX ORDER — FLUMAZENIL 0.1 MG/ML
0.2 INJECTION, SOLUTION INTRAVENOUS
Status: ACTIVE | OUTPATIENT
Start: 2023-10-09 | End: 2023-10-09

## 2023-10-09 NOTE — LETTER
[address]  [address] [facility] [address] [phone]          September 26, 2023    Shahab Seals                                                                                                                      [id number]  [facility] [address]            Dear Shahab Gudino,     We apologize that we have been unable to contact you by phone. We are calling in regards to your upcoming Colonoscopy  procedure that is scheduled on 10.9.23 to complete pre assessment.    Please contact our pre assessment nursing team at your earliest convenience at [phone] option 4. We are open Monday through Friday, 7:00am to 5:00pm. Note that failure to complete Nurse assessment phone call may result in your procedure being cancelled.     Our schedules fill up quickly and are in high demand. If you know that you need to cancel, please contact us so that we can accommodate scheduling for other patients. Our endoscopy scheduling team can be reached at [phone] option 2.     Thank you,  [facility] Endoscopy Team                Sincerely,          SENIA Waite

## 2023-10-09 NOTE — H&P
"Gastroenterology Pre-op History and Physical    Shahab Seals MRN# [id number]   Age: 63 year old YOB: 1960      Date of Surgery: 10/09/23  [facility]      Date of Exam 10/9/2023 Facility Same Day       Primary care provider: No primary care provider on file.         Chief Complaint and/or Reason for Procedure:   63M w/several month history of decreasing caliber bowel movements, resolved somewhat with psyllium husk and feeling of abdominal fullness.  Denies hematochezia.  No previous CSP.  Father had colon resection for adhesive disease, but no family history of CRC.             Past Medical and Surgical History:     Past Medical History:   Diagnosis Date    Arthritis     Asthma     Depression     Fainting spell 5/9/14    Prostatic enlargement 9/9/2021    Syncope, unspecified syncope type 8/30/2015     History reviewed. No pertinent surgical history.         Medications (include herbals and vitamins):        (Not in a hospital admission)            Allergies:      Allergies   Allergen Reactions    Seasonal Allergies                Physical Exam:   All vitals have been reviewed  Patient Vitals for the past 8 hrs:   BP Temp Temp src Pulse Resp SpO2 Height Weight   10/09/23 0927 134/80 98.5  F (36.9  C) Temporal 78 18 97 % 1.727 m (5' 8\") 86.2 kg (190 lb)     No intake/output data recorded.  Airway assessment:   Patient is able to open mouth wide  Patient is able to stick out tongue  Mallampatti classification: Class III (visualization of the soft palate and base of uvula)}      ENT:   Normocephalic, without obvious abnormality, atraumatic, sinuses nontender on palpation, external ears without lesions, oral pharynx with moist mucous membranes, tonsils without erythema or exudates, gums normal and good dentition.     Lungs:   No increased work of breathing, good air exchange, clear to auscultation bilaterally, no crackles or wheezing     Cardiovascular:   regular " rate and rhythm                 Anesthetic risk and/or ASA classification: 3   63M w/several month history of decreasing caliber bowel movements, resolved somewhat with psyllium husk and feeling of abdominal fullness. Most likely etiology is IBS, however will proceed with CSP given no prior screening as well.    Rakel Roa MD

## 2023-10-10 LAB
PATH REPORT.COMMENTS IMP SPEC: NORMAL
PATH REPORT.COMMENTS IMP SPEC: NORMAL
PATH REPORT.FINAL DX SPEC: NORMAL
PATH REPORT.GROSS SPEC: NORMAL
PATH REPORT.MICROSCOPIC SPEC OTHER STN: NORMAL
PATH REPORT.RELEVANT HX SPEC: NORMAL
PHOTO IMAGE: NORMAL

## 2023-11-16 ENCOUNTER — LAB REQUISITION (OUTPATIENT)
Dept: LAB | Facility: CLINIC | Age: 63
End: 2023-11-16
Payer: COMMERCIAL

## 2023-11-16 DIAGNOSIS — E78.5 HYPERLIPIDEMIA, UNSPECIFIED: ICD-10-CM

## 2023-11-16 LAB
CHOLEST SERPL-MCNC: 216 MG/DL
HDLC SERPL-MCNC: 47 MG/DL
LDLC SERPL CALC-MCNC: 138 MG/DL
NONHDLC SERPL-MCNC: 169 MG/DL
TRIGL SERPL-MCNC: 155 MG/DL

## 2023-11-16 PROCEDURE — 80061 LIPID PANEL: CPT | Mod: ORL | Performed by: NURSE PRACTITIONER

## 2024-01-17 ENCOUNTER — LAB REQUISITION (OUTPATIENT)
Dept: LAB | Facility: CLINIC | Age: 64
End: 2024-01-17
Payer: COMMERCIAL

## 2024-01-17 DIAGNOSIS — E78.5 HYPERLIPIDEMIA, UNSPECIFIED: ICD-10-CM

## 2024-01-17 PROCEDURE — 80061 LIPID PANEL: CPT | Mod: ORL | Performed by: NURSE PRACTITIONER

## 2024-01-19 LAB
CHOLEST SERPL-MCNC: 141 MG/DL
FASTING STATUS PATIENT QL REPORTED: NO
HDLC SERPL-MCNC: 46 MG/DL
LDLC SERPL CALC-MCNC: 76 MG/DL
NONHDLC SERPL-MCNC: 95 MG/DL
TRIGL SERPL-MCNC: 94 MG/DL

## 2024-12-31 ENCOUNTER — LAB REQUISITION (OUTPATIENT)
Dept: LAB | Facility: CLINIC | Age: 64
End: 2024-12-31

## 2024-12-31 DIAGNOSIS — E78.5 HYPERLIPIDEMIA, UNSPECIFIED: ICD-10-CM

## 2024-12-31 LAB
CHOLEST SERPL-MCNC: 132 MG/DL
FASTING STATUS PATIENT QL REPORTED: NO
HDLC SERPL-MCNC: 56 MG/DL
LDLC SERPL CALC-MCNC: 64 MG/DL
NONHDLC SERPL-MCNC: 76 MG/DL
TRIGL SERPL-MCNC: 58 MG/DL

## 2024-12-31 PROCEDURE — 80061 LIPID PANEL: CPT | Performed by: FAMILY MEDICINE

## 2024-12-31 PROCEDURE — 82465 ASSAY BLD/SERUM CHOLESTEROL: CPT | Performed by: FAMILY MEDICINE

## 2025-02-10 ENCOUNTER — MEDICAL CORRESPONDENCE (OUTPATIENT)
Dept: HEALTH INFORMATION MANAGEMENT | Facility: CLINIC | Age: 65
End: 2025-02-10
Payer: COMMERCIAL

## 2025-05-20 ENCOUNTER — MEDICAL CORRESPONDENCE (OUTPATIENT)
Dept: HEALTH INFORMATION MANAGEMENT | Facility: CLINIC | Age: 65
End: 2025-05-20
Payer: MEDICAID

## 2025-05-21 ENCOUNTER — TRANSCRIBE ORDERS (OUTPATIENT)
Dept: OTHER | Age: 65
End: 2025-05-21

## 2025-05-21 DIAGNOSIS — K42.9 UMBILICAL HERNIA WITHOUT OBSTRUCTION AND WITHOUT GANGRENE: Primary | ICD-10-CM

## 2025-05-21 DIAGNOSIS — K40.90 NON-RECURRENT UNILATERAL INGUINAL HERNIA WITHOUT OBSTRUCTION OR GANGRENE: ICD-10-CM

## 2025-05-27 ENCOUNTER — OFFICE VISIT (OUTPATIENT)
Dept: SURGERY | Facility: CLINIC | Age: 65
End: 2025-05-27
Attending: FAMILY MEDICINE
Payer: COMMERCIAL

## 2025-05-27 VITALS
DIASTOLIC BLOOD PRESSURE: 77 MMHG | SYSTOLIC BLOOD PRESSURE: 111 MMHG | HEIGHT: 68 IN | HEART RATE: 78 BPM | BODY MASS INDEX: 26.36 KG/M2 | OXYGEN SATURATION: 99 % | WEIGHT: 173.9 LBS

## 2025-05-27 DIAGNOSIS — R52 PAIN: Primary | ICD-10-CM

## 2025-05-27 DIAGNOSIS — K42.9 UMBILICAL HERNIA WITHOUT OBSTRUCTION AND WITHOUT GANGRENE: ICD-10-CM

## 2025-05-27 DIAGNOSIS — K40.90 NON-RECURRENT UNILATERAL INGUINAL HERNIA WITHOUT OBSTRUCTION OR GANGRENE: ICD-10-CM

## 2025-05-27 PROCEDURE — 99203 OFFICE O/P NEW LOW 30 MIN: CPT | Performed by: SURGERY

## 2025-05-27 RX ORDER — ALBUTEROL SULFATE 90 UG/1
1-2 INHALANT RESPIRATORY (INHALATION)
COMMUNITY
Start: 2024-03-28

## 2025-05-27 RX ORDER — ACETAMINOPHEN 500 MG
500 TABLET ORAL
COMMUNITY
Start: 2025-05-20

## 2025-05-27 RX ORDER — BUDESONIDE AND FORMOTEROL FUMARATE DIHYDRATE 80; 4.5 UG/1; UG/1
2 AEROSOL RESPIRATORY (INHALATION)
COMMUNITY
Start: 2024-01-17

## 2025-05-27 RX ORDER — ATORVASTATIN CALCIUM 20 MG/1
20 TABLET, FILM COATED ORAL DAILY
COMMUNITY
Start: 2025-05-20

## 2025-05-27 ASSESSMENT — PAIN SCALES - GENERAL: PAINLEVEL_OUTOF10: NO PAIN (0)

## 2025-05-27 NOTE — PATIENT INSTRUCTIONS
You met with Dr. Patrick Kirkland.      Today's visit instructions:    Please call the Nurse Advice line listed below if you decide to proceed with surgery. At that time, we would recommend undergoing an ultrasound to confirm inguinal hernia.        If you have questions please contact Sachi RN or Faviola RN during regular clinic hours, Monday through Friday 7:30 AM - 4:00 PM, or you can contact us via [employer] at anytime.       If you have urgent needs after-hours, weekends, or holidays please call the hospital at [phone] and ask to speak with our on-call General Surgery Team.    Appointment [phone]  Nurse Advice (Sachi or Faviola): [phone]   Surgery Scheduler (Ame): [phone]  Billing Customer Service:  [phone]  Fax: [phone]

## 2025-05-27 NOTE — LETTER
"5/27/2025       RE: Shahab Seals  [id number]  [facility] [address]     Dear Colleague,    Thank you for referring your patient, Shahab Seals, to the [facility] [place] [facility]. Please see a copy of my visit note below.    Shahab Seals is a 64 year old male with a 10 year history of a right inguinal mass with the following symptoms of pain.      Obstructive symptoms:  no  Urinary difficulties:  occasionally  Chronic cough: no  Constipation:  occasionally  Current level of activity:  Low    Past medical and surgical history, medications, allergies, family history, and social history were reviewed with the patient.  Past Medical History:   Diagnosis Date     Arthritis      Asthma      Depression      Fainting spell 5/9/14     Prostatic enlargement 9/9/2021     Syncope, unspecified syncope type 8/30/2015     In middle of work up for syncope.    ROS: 10 point review of systems negative except noted in HPI  PHYSICAL EXAM  General appearance- alert, and in no distress.  Skin- Skin color and turgor decreased.  Lungs- Respiratory effort unlabored.  Gait- uses cane.  Abdomen - soft non distended, non tender supra umbilical wide based hernia at previous surgical scar.  Unable to examine inguinal canals secondary to patient   Impression: possible inguinal hernia.  At this point patient is unsure if he can proceed with surgery.  Should he decide on surgery I have asked him to have a inguinal ultrasound to confirm diagnosis.  He will call should he decide to proceed.    \"Total time = 30 minutes, spent on the date of encounter doing chart review, history and physical, documentation, patient education, and any further activity as noted above.            Again, thank you for allowing me to participate in the care of your patient.      Sincerely,    Patrick Kirkland MD    "

## 2025-05-27 NOTE — PROGRESS NOTES
"Shahab Seals is a 64 year old male with a 10 year history of a right inguinal mass with the following symptoms of pain.      Obstructive symptoms:  no  Urinary difficulties:  occasionally  Chronic cough: no  Constipation:  occasionally  Current level of activity:  Low    Past medical and surgical history, medications, allergies, family history, and social history were reviewed with the patient.  Past Medical History:   Diagnosis Date    Arthritis     Asthma     Depression     Fainting spell 5/9/14    Prostatic enlargement 9/9/2021    Syncope, unspecified syncope type 8/30/2015     In middle of work up for syncope.    ROS: 10 point review of systems negative except noted in HPI  PHYSICAL EXAM  General appearance- alert, and in no distress.  Skin- Skin color and turgor decreased.  Lungs- Respiratory effort unlabored.  Gait- uses cane.  Abdomen - soft non distended, non tender supra umbilical wide based hernia at previous surgical scar.  Unable to examine inguinal canals secondary to patient   Impression: possible inguinal hernia.  At this point patient is unsure if he can proceed with surgery.  Should he decide on surgery I have asked him to have a inguinal ultrasound to confirm diagnosis.  He will call should he decide to proceed.    \"Total time = 30 minutes, spent on the date of encounter doing chart review, history and physical, documentation, patient education, and any further activity as noted above.          "

## 2025-05-27 NOTE — NURSING NOTE
"Chief Complaint   Patient presents with    New Patient     Umbilical hernia and non-recurrent unilateral inguinal hernia.       Vitals:    05/27/25 1127   BP: 111/77   BP Location: Left arm   Patient Position: Sitting   Cuff Size: Adult Regular   Pulse: 78   SpO2: 99%   Weight: 78.9 kg (173 lb 14.4 oz)   Height: 1.727 m (5' 8\")       Body mass index is 26.44 kg/m .    Oliva Menchaca, EMT    "

## 2025-06-04 ENCOUNTER — MEDICAL CORRESPONDENCE (OUTPATIENT)
Dept: HEALTH INFORMATION MANAGEMENT | Facility: CLINIC | Age: 65
End: 2025-06-04
Payer: COMMERCIAL

## 2025-06-10 ENCOUNTER — TELEPHONE (OUTPATIENT)
Dept: OTOLARYNGOLOGY | Facility: CLINIC | Age: 65
End: 2025-06-10

## 2025-06-10 DIAGNOSIS — H91.20 SUDDEN HEARING LOSS AFTER TRAUMA: Primary | ICD-10-CM

## 2025-06-10 NOTE — TELEPHONE ENCOUNTER
This patient is unable to be accommodated through the escalation process. Call patient to offer affiliate partner option, or can wait to be seen here on  Pt has an emergency 1-2 days referral. Patient has been scheduled for the first available opening with Prashanth Snyder MD on 07/22/25. Pt would like to be seen at [facility] location. We have informed the patient that the clinic will review their referral and reach out if a sooner appointment is medically necessary. Did confirm phone number and pt is self pay. Thanks!

## 2025-06-11 NOTE — TELEPHONE ENCOUNTER
Phone call to pt, left message to call back clinic to move up audiogram appointment to next week at [place].  Citlali Aaron RN

## 2025-06-12 NOTE — TELEPHONE ENCOUNTER
Left Voicemail (1st Attempt) for the patient to call back and schedule the following:    Appointment Type: ENT AUDIO  Provider: ANY  Appt date: R/S 7/22 TO [facility]  Specialty phone number: [phone]

## 2025-06-18 ENCOUNTER — OFFICE VISIT (OUTPATIENT)
Dept: AUDIOLOGY | Facility: CLINIC | Age: 65
End: 2025-06-18

## 2025-06-18 ENCOUNTER — TELEPHONE (OUTPATIENT)
Dept: OTOLARYNGOLOGY | Facility: CLINIC | Age: 65
End: 2025-06-18

## 2025-06-18 DIAGNOSIS — H91.20 SUDDEN HEARING LOSS AFTER TRAUMA: ICD-10-CM

## 2025-06-18 DIAGNOSIS — H93.12 TINNITUS OF LEFT EAR: ICD-10-CM

## 2025-06-18 DIAGNOSIS — H90.42 SENSORINEURAL HEARING LOSS (SNHL) OF LEFT EAR WITH UNRESTRICTED HEARING OF RIGHT EAR: Primary | ICD-10-CM

## 2025-06-18 DIAGNOSIS — H91.20 SUDDEN HEARING LOSS AFTER TRAUMA: Primary | ICD-10-CM

## 2025-06-18 RX ORDER — PREDNISONE 10 MG/1
TABLET ORAL
Status: CANCELLED | OUTPATIENT
Start: 2025-06-18

## 2025-06-18 RX ORDER — PREDNISONE 10 MG/1
TABLET ORAL
Qty: 54 TABLET | Refills: 0 | Status: SHIPPED | OUTPATIENT
Start: 2025-06-18 | End: 2025-07-02

## 2025-06-18 NOTE — TELEPHONE ENCOUNTER
Phone call to pt.  Discussed audiogram results showing asymmetrical hearing loss.  Pt states he did not notice any change in hearing prior to accident.  Explained Dr Snyder's recommendation to start a prednisone taper for sudden hearing loss.  Explained that he may have some stomach issues with high dose prednisone and may need to take pepcid, etc if upset occurs.  Pt verbalized understanding of plan.   He will start medication course and call if any questions arise.  ENT appointment with Dr Snyder and audiology recheck scheduled 7/22/25. Citlali Aaron RN

## 2025-06-18 NOTE — PROGRESS NOTES
AUDIOLOGY REPORT    SUMMARY: Audiology visit completed. See audiogram for results.      RECOMMENDATIONS: Follow-up with ENT.    Akiko Jama.  Licensed Audiologist  [place] # [id number]

## 2025-07-22 ENCOUNTER — OFFICE VISIT (OUTPATIENT)
Dept: AUDIOLOGY | Facility: CLINIC | Age: 65
End: 2025-07-22

## 2025-07-22 ENCOUNTER — OFFICE VISIT (OUTPATIENT)
Dept: OTOLARYNGOLOGY | Facility: CLINIC | Age: 65
End: 2025-07-22

## 2025-07-22 DIAGNOSIS — H90.42 SENSORINEURAL HEARING LOSS (SNHL) OF LEFT EAR WITH UNRESTRICTED HEARING OF RIGHT EAR: Primary | ICD-10-CM

## 2025-07-22 DIAGNOSIS — H91.20 SUDDEN HEARING LOSS AFTER TRAUMA: ICD-10-CM

## 2025-07-22 DIAGNOSIS — H90.3 SNHL (SENSORY-NEURAL HEARING LOSS), ASYMMETRICAL: Primary | ICD-10-CM

## 2025-07-22 DIAGNOSIS — S06.0X1A CONCUSSION WITH LOSS OF CONSCIOUSNESS OF 30 MINUTES OR LESS, INITIAL ENCOUNTER: ICD-10-CM

## 2025-07-22 PROCEDURE — 92550 TYMPANOMETRY & REFLEX THRESH: CPT | Performed by: AUDIOLOGIST

## 2025-07-22 PROCEDURE — 99203 OFFICE O/P NEW LOW 30 MIN: CPT | Performed by: OTOLARYNGOLOGY

## 2025-07-22 PROCEDURE — 92557 COMPREHENSIVE HEARING TEST: CPT | Performed by: AUDIOLOGIST

## 2025-07-22 ASSESSMENT — ENCOUNTER SYMPTOMS
DOUBLE VISION: 0
CONSTITUTIONAL NEGATIVE: 1
WEAKNESS: 0
TINGLING: 0
DIZZINESS: 0
BLURRED VISION: 0
GASTROINTESTINAL NEGATIVE: 1
RESPIRATORY NEGATIVE: 1
EYES NEGATIVE: 1

## 2025-07-22 NOTE — PROGRESS NOTES
AUDIOLOGY REPORT    SUMMARY: Audiology visit completed. See audiogram for results.    RECOMMENDATIONS: Follow-up with ENT.    Keaton Waldron  Doctor of Audiology  [place] License # [id number]

## 2025-07-22 NOTE — PROGRESS NOTES
Chief Complaint   Patient presents with    Consult     Concussion from a fall 5-24-25. Sudden loss of hearing. Spent the night at [facility] [place] and given Prednisone for 2 weeks. Had an Audiogram 6-18-25 and told the hearing loss was not related to the fall. Also Tinnitus.       PCP: Miguel Bacon     Referring Provider: Miguel Bacon    There were no vitals taken for this visit.    ENT Problem List:  Patient Active Problem List   Diagnosis    Major depressive disorder, recurrent episode    Asthma    Syncope, unspecified syncope type    Prostatic enlargement      Current Medications:  Current Outpatient Medications   Medication Sig Dispense Refill    acetaminophen (TYLENOL) 500 MG tablet Take 500 mg by mouth.      albuterol (PROAIR HFA/PROVENTIL HFA/VENTOLIN HFA) 108 (90 Base) MCG/ACT inhaler Inhale 1-2 puffs into the lungs.      atorvastatin (LIPITOR) 20 MG tablet Take 20 mg by mouth daily.      budesonide-formoterol (SYMBICORT/BREYNA) 80-4.5 MCG/ACT inhaler Inhale 2 puffs into the lungs.      ibuprofen (ADVIL/MOTRIN) 200 MG tablet Take 3 tablets (600 mg) by mouth every 8 hours as needed for mild pain 270 tablet 0    Psyllium (WAL-MUCIL) 43 % POWD Take 1 Dose by mouth.       No current facility-administered medications for this visit.     Surgical History:  Past Surgical History:   Procedure Laterality Date    COLONOSCOPY N/A 10/9/2023    Procedure: Colonoscopy WITH POLYPECTOMY;  Surgeon: Rakel Roa MD;  Location: [facility] OR     Head CT without contrast 5/24/2025    Indication: 64 years Male Fall and hitting head with increasing headache.  .     Comparison: CT head 8/24/2016.     Technique: Thin section CT images of the brain were obtained from the base of the skull to the vertex without intravenous contrast, reconstructed in axial, coronal, and sagittal planes, and reviewed in brain, bone, and subdural windows.     Radiation dose: Total DLP = 1146.9 mGy.cm.     Findings: No acute intracranial  hemorrhage, mass effect, herniation, or abnormal extraaxial fluid collection. Parenchymal volume is age-appropriate; ventricles and sulci are proportional. Gray-white matter differentiation appears preserved throughout both cerebral hemispheres. Vascular calcifications.. Mild cerebellar volume loss.     The bones of the calvaria and skull base appear intact. No external soft tissue swelling. Diffuse mucosal thickening and dependent debris throughout the paranasal sinuses. Mastoid air cells clear. Orbits appear unremarkable. Periapical lucency of the front right maxillary incisor and canine.     Impression: No acute intracranial pathology. Acute pansinusitis.     Elmer Traumatic Brain Injury Scale: Diffuse Injury 1     ELMER DIAGNOSTIC CATEGORIES OF ABNORMALITIES VISUALIZED ON CT SCANNING FOR TRAUMATIC BRAIN INJURY:     Diffuse Injury 1: No visible intracranial pathology seen on CT scan.     Diffuse Injury 2: Cisterns are present with shift 0-5 mm and/or lesion densities present. No high or mixed density lesion >25ml. May include bone fragments and foreign bodies.     Diffuse Injury 3 (swelling): Cisterns compressed or absent with shift 0-5mm. No high or mixed density lesion > 25ml.          Diffuse Injury 4 (shift): Shift > 5mm. No high or mixed density lesion > 25ml.     Evacuated mass lesion: Any surgically evacuated lesion.      Non evacuated mass lesion: High or mixed-density lesion > 25ml. Not surgically evacuated.     HPI  Pleasant 64 year old male presents today as a(n) new patient for a concussion from a fall on 5-24-25. He reports sudden loss of hearing he was told was not related to his fall and subsequent concussion. He states that he spent the night at [facility] and was given Prednisone for 2 weeks. He states that he fell when he was at a [employer] drinking coffee after he suddenly fell faint, light headed, and dizzy. He felt weak at this time, as if he was going to fall asleep, and lightheaded. He did  "not feel spinning at this time, and hit his head on the right side. He is unsure how long he was unconscious for, and the workers called for an ambulance and he woke up on the ground before being taken to the hospital.  He reports that in regards to his hearing, people sound \"far away\". He states that his progress in hearing loss is not linear,and that tinnitus onset with the hearing loss and concussion.  He reports tinnitus mostly in the left ear. He describes the tinnitus as when putting a shell up to your ear in the ocean.   He reports aural fullness/pressure, worse in the left ear. He states that his ear feels plugged up.    He denies dizziness/vertigo, blurry vision, double vision, weakness, numbness, tingling, imbalance, hx of tonsil stones.    Review of Systems   Constitutional: Negative.    HENT:  Positive for congestion (ear), hearing loss and tinnitus.    Eyes: Negative.  Negative for blurred vision and double vision.   Respiratory: Negative.     Gastrointestinal: Negative.    Skin: Negative.    Neurological:  Negative for dizziness, tingling and weakness.   Endo/Heme/Allergies: Negative.        Physical Exam  Vitals and nursing note reviewed.   Constitutional:       Appearance: Normal appearance.   HENT:      Head: Normocephalic and atraumatic.      Jaw: There is normal jaw occlusion.      Right Ear: Hearing, tympanic membrane and ear canal normal.      Left Ear: Hearing, tympanic membrane and ear canal normal.      Nose: No mucosal edema, congestion or rhinorrhea.      Right Nostril: No occlusion.      Left Nostril: No occlusion.      Right Turbinates: Not enlarged or swollen.      Left Turbinates: Not enlarged or swollen.      Right Sinus: No maxillary sinus tenderness or frontal sinus tenderness.      Left Sinus: No maxillary sinus tenderness or frontal sinus tenderness.      Mouth/Throat:      Mouth: Mucous membranes are moist.      Pharynx: Oropharynx is clear. Uvula midline.   Eyes:      Extraocular " Movements: Extraocular movements intact.      Pupils: Pupils are equal, round, and reactive to light.   Neurological:      Mental Status: He is alert.       AUDIOGRAM: The patient underwent an audiogram on 6/18/25.  Right: Speech reception threshold is 15 dB with 100% word recognition. Tympanogram A type.  Left: Speech reception threshold is 20 dB with 96% word recognition. Tympanogram A type.    Hx: Patient reports muffled left hearing with tinnitus and plugged sensation since a fainting episode on 5/24/24. Reports he hit the right side of his forehead when he fell and was taken to the hospital, but no skull fracture. Denies pain, drainage, dizziness, and hx of ear surgeries.   Results: Partially occluding cerumen bilaterally, removed with a lighted curette. Right: Normal hearing. Left: Normal to mild SNHL rising to normal hearing. 10-20 dB asymmetry from 0.25-1 kHz. Tymps WNL bilaterally. Reflexes: Right ipsi/left contra present at normal level, could not accurately assess left ipsi/right contra due to noisy tracings    AUDIOGRAM: The patient underwent an audiogram today.  Right: Speech reception threshold is 15 dB with 100% word recognition.  Left: Speech reception threshold is 20 dB with 96% word recognition.     Hx: F/U testing. Pt reports suspected hearing shift following fainting episode on 5/24/25. Previous results on 6/18/25 revealed normal hearing in the right and normal except for a mild SNHL in the 500-1,000 Hz range in the left. Pt also reports tinnitus in the left.   Results: Hearing WNL right. WNL sloping to mild SNHL rising back to normal in left. 100% word rec. right, 96% left. Results stable compared with results from 6/18/25. Tymps WNL. Reflexes as marked    A/P  This pleasant patient has a hx of sudden hearing loss after trauma, asymmetrical SNHL, and a concussion with loss of consciousness of 30 minutes or less. Audiograms and head CT were independently reviewed and discussed in detail with the  patient. Further investigation is needed, as such an MR Brain w/o & w Contrast was ordered. He will receive a phone call to review the results of this test and future potential treatment options such as left intratympanic steroid inoculation. Questions and concerns were addressed.    Follow up in clinic prn pending imaging, MR brain, results    Scribe/Staff:    Scribe Disclosure:   I, Zita Terrell, am serving as a scribe; to document services personally performed by Prashanth Snyder MD based on data collection and the provider's statements to me.     Insert provider disclosure and electronic signature here Provider Disclosure:  I agree with above History, Review of Systems, Physical exam and Plan.  I have reviewed the content of the documentation and have edited it as needed. I have personally performed the services documented here and the documentation accurately represents those services and the decisions I have made.    Prashanth Snyder MD

## 2025-07-22 NOTE — LETTER
7/22/2025      Shahab Seals  [id number]  [place] [address]      Dear Colleague,    Thank you for referring your patient, Shahab Seals, to the [facility]. Please see a copy of my visit note below.    Chief Complaint   Patient presents with     Consult     Concussion from a fall 5-24-25. Sudden loss of hearing. Spent the night at [facility] [place] and given Prednisone for 2 weeks. Had an Audiogram 6-18-25 and told the hearing loss was not related to the fall. Also Tinnitus.       PCP: Miguel Bacon     Referring Provider: Miguel Bacon    There were no vitals taken for this visit.    ENT Problem List:  Patient Active Problem List   Diagnosis     Major depressive disorder, recurrent episode     Asthma     Syncope, unspecified syncope type     Prostatic enlargement      Current Medications:  Current Outpatient Medications   Medication Sig Dispense Refill     acetaminophen (TYLENOL) 500 MG tablet Take 500 mg by mouth.       albuterol (PROAIR HFA/PROVENTIL HFA/VENTOLIN HFA) 108 (90 Base) MCG/ACT inhaler Inhale 1-2 puffs into the lungs.       atorvastatin (LIPITOR) 20 MG tablet Take 20 mg by mouth daily.       budesonide-formoterol (SYMBICORT/BREYNA) 80-4.5 MCG/ACT inhaler Inhale 2 puffs into the lungs.       ibuprofen (ADVIL/MOTRIN) 200 MG tablet Take 3 tablets (600 mg) by mouth every 8 hours as needed for mild pain 270 tablet 0     Psyllium (WAL-MUCIL) 43 % POWD Take 1 Dose by mouth.       No current facility-administered medications for this visit.     Surgical History:  Past Surgical History:   Procedure Laterality Date     COLONOSCOPY N/A 10/9/2023    Procedure: Colonoscopy WITH POLYPECTOMY;  Surgeon: Rakel Roa MD;  Location: [facility] OR     Head CT without contrast 5/24/2025    Indication: 64 years Male Fall and hitting head with increasing headache.  .     Comparison: CT head 8/24/2016.     Technique: Thin section CT images of the brain were obtained from the base  of the skull to the vertex without intravenous contrast, reconstructed in axial, coronal, and sagittal planes, and reviewed in brain, bone, and subdural windows.     Radiation dose: Total DLP = 1146.9 mGy.cm.     Findings: No acute intracranial hemorrhage, mass effect, herniation, or abnormal extraaxial fluid collection. Parenchymal volume is age-appropriate; ventricles and sulci are proportional. Gray-white matter differentiation appears preserved throughout both cerebral hemispheres. Vascular calcifications.. Mild cerebellar volume loss.     The bones of the calvaria and skull base appear intact. No external soft tissue swelling. Diffuse mucosal thickening and dependent debris throughout the paranasal sinuses. Mastoid air cells clear. Orbits appear unremarkable. Periapical lucency of the front right maxillary incisor and canine.     Impression: No acute intracranial pathology. Acute pansinusitis.     Elmer Traumatic Brain Injury Scale: Diffuse Injury 1     ELMER DIAGNOSTIC CATEGORIES OF ABNORMALITIES VISUALIZED ON CT SCANNING FOR TRAUMATIC BRAIN INJURY:     Diffuse Injury 1: No visible intracranial pathology seen on CT scan.     Diffuse Injury 2: Cisterns are present with shift 0-5 mm and/or lesion densities present. No high or mixed density lesion >25ml. May include bone fragments and foreign bodies.     Diffuse Injury 3 (swelling): Cisterns compressed or absent with shift 0-5mm. No high or mixed density lesion > 25ml.          Diffuse Injury 4 (shift): Shift > 5mm. No high or mixed density lesion > 25ml.     Evacuated mass lesion: Any surgically evacuated lesion.      Non evacuated mass lesion: High or mixed-density lesion > 25ml. Not surgically evacuated.     HPI  Pleasant 64 year old male presents today as a(n) new patient for a concussion from a fall on 5-24-25. He reports sudden loss of hearing he was told was not related to his fall and subsequent concussion. He states that he spent the night at [facility] [place]  "and was given Prednisone for 2 weeks. He states that he fell when he was at a [employer] drinking coffee after he suddenly fell faint, light headed, and dizzy. He felt weak at this time, as if he was going to fall asleep, and lightheaded. He did not feel spinning at this time, and hit his head on the right side. He is unsure how long he was unconscious for, and the workers called for an ambulance and he woke up on the ground before being taken to the hospital.  He reports that in regards to his hearing, people sound \"far away\". He states that his progress in hearing loss is not linear,and that tinnitus onset with the hearing loss and concussion.  He reports tinnitus mostly in the left ear. He describes the tinnitus as when putting a shell up to your ear in the ocean.   He reports aural fullness/pressure, worse in the left ear. He states that his ear feels plugged up.    He denies dizziness/vertigo, blurry vision, double vision, weakness, numbness, tingling, imbalance, hx of tonsil stones.    Review of Systems   Constitutional: Negative.    HENT:  Positive for congestion (ear), hearing loss and tinnitus.    Eyes: Negative.  Negative for blurred vision and double vision.   Respiratory: Negative.     Gastrointestinal: Negative.    Skin: Negative.    Neurological:  Negative for dizziness, tingling and weakness.   Endo/Heme/Allergies: Negative.        Physical Exam  Vitals and nursing note reviewed.   Constitutional:       Appearance: Normal appearance.   HENT:      Head: Normocephalic and atraumatic.      Jaw: There is normal jaw occlusion.      Right Ear: Hearing, tympanic membrane and ear canal normal.      Left Ear: Hearing, tympanic membrane and ear canal normal.      Nose: No mucosal edema, congestion or rhinorrhea.      Right Nostril: No occlusion.      Left Nostril: No occlusion.      Right Turbinates: Not enlarged or swollen.      Left Turbinates: Not enlarged or swollen.      Right Sinus: No maxillary sinus " tenderness or frontal sinus tenderness.      Left Sinus: No maxillary sinus tenderness or frontal sinus tenderness.      Mouth/Throat:      Mouth: Mucous membranes are moist.      Pharynx: Oropharynx is clear. Uvula midline.   Eyes:      Extraocular Movements: Extraocular movements intact.      Pupils: Pupils are equal, round, and reactive to light.   Neurological:      Mental Status: He is alert.       AUDIOGRAM: The patient underwent an audiogram on 6/18/25.  Right: Speech reception threshold is 15 dB with 100% word recognition. Tympanogram A type.  Left: Speech reception threshold is 20 dB with 96% word recognition. Tympanogram A type.    Hx: Patient reports muffled left hearing with tinnitus and plugged sensation since a fainting episode on 5/24/24. Reports he hit the right side of his forehead when he fell and was taken to the hospital, but no skull fracture. Denies pain, drainage, dizziness, and hx of ear surgeries.   Results: Partially occluding cerumen bilaterally, removed with a lighted curette. Right: Normal hearing. Left: Normal to mild SNHL rising to normal hearing. 10-20 dB asymmetry from 0.25-1 kHz. Tymps WNL bilaterally. Reflexes: Right ipsi/left contra present at normal level, could not accurately assess left ipsi/right contra due to noisy tracings    AUDIOGRAM: The patient underwent an audiogram today.  Right: Speech reception threshold is 15 dB with 100% word recognition.  Left: Speech reception threshold is 20 dB with 96% word recognition.     Hx: F/U testing. Pt reports suspected hearing shift following fainting episode on 5/24/25. Previous results on 6/18/25 revealed normal hearing in the right and normal except for a mild SNHL in the 500-1,000 Hz range in the left. Pt also reports tinnitus in the left.   Results: Hearing WNL right. WNL sloping to mild SNHL rising back to normal in left. 100% word rec. right, 96% left. Results stable compared with results from 6/18/25. Tymps WNL. Reflexes as  marked    A/P  This pleasant patient has a hx of sudden hearing loss after trauma, asymmetrical SNHL, and a concussion with loss of consciousness of 30 minutes or less. Audiograms and head CT were independently reviewed and discussed in detail with the patient. Further investigation is needed, as such an MR Brain w/o & w Contrast was ordered. He will receive a phone call to review the results of this test and future potential treatment options such as left intratympanic steroid inoculation. Questions and concerns were addressed.    Follow up in clinic prn pending imaging, MR brain, results    Scribe/Staff:    Scribe Disclosure:   I, Zita Terrell, am serving as a scribe; to document services personally performed by Prashanth Snyder MD based on data collection and the provider's statements to me.     Insert provider disclosure and electronic signature here Provider Disclosure:  I agree with above History, Review of Systems, Physical exam and Plan.  I have reviewed the content of the documentation and have edited it as needed. I have personally performed the services documented here and the documentation accurately represents those services and the decisions I have made.    Prashanth Snyder MD       Again, thank you for allowing me to participate in the care of your patient.        Sincerely,        Prashanth Snyder MD    Electronically signed

## 2025-07-22 NOTE — NURSING NOTE
Shahab Seals's chief complaint for this visit includes:  Chief Complaint   Patient presents with    Consult     Concussion from a fall 5-24-25. Sudden loss of hearing. Spent the night at [facility] [place] and given Prednisone for 2 weeks. Had an Audiogram 6-18-25 and told the hearing loss was not related to the fall. Also Tinnitus.      PCP: Miguel Bacon    Referring Provider:  Miguel Bacon MD  [address] [place] [address]    There were no vitals taken for this visit.

## 2025-07-30 ENCOUNTER — TELEPHONE (OUTPATIENT)
Dept: OTOLARYNGOLOGY | Facility: CLINIC | Age: 65
End: 2025-07-30

## 2025-07-30 DIAGNOSIS — F40.240 CLAUSTROPHOBIA: ICD-10-CM

## 2025-07-30 DIAGNOSIS — H90.3 SNHL (SENSORY-NEURAL HEARING LOSS), ASYMMETRICAL: Primary | ICD-10-CM

## 2025-07-30 RX ORDER — DIAZEPAM 5 MG/1
TABLET ORAL
Qty: 2 TABLET | Refills: 0 | Status: SHIPPED | OUTPATIENT
Start: 2025-07-30

## 2025-07-30 NOTE — TELEPHONE ENCOUNTER
Phone call received from pt stating he has an MRI scheduled for Friday, but has claustrophobia and anxiety.  He would like to use a sedative for this scan.  Sent to Dr Snyder for review.  Citlali aAron RN

## 2025-08-01 ENCOUNTER — ANCILLARY PROCEDURE (OUTPATIENT)
Dept: MRI IMAGING | Facility: CLINIC | Age: 65
End: 2025-08-01
Attending: OTOLARYNGOLOGY

## 2025-08-01 DIAGNOSIS — H91.20 SUDDEN HEARING LOSS AFTER TRAUMA: ICD-10-CM

## 2025-08-01 DIAGNOSIS — H90.3 SNHL (SENSORY-NEURAL HEARING LOSS), ASYMMETRICAL: ICD-10-CM

## 2025-08-01 PROCEDURE — 70553 MRI BRAIN STEM W/O & W/DYE: CPT | Performed by: RADIOLOGY

## 2025-08-01 PROCEDURE — A9585 GADOBUTROL INJECTION: HCPCS | Mod: JZ | Performed by: RADIOLOGY

## 2025-08-01 RX ORDER — GADOBUTROL 604.72 MG/ML
7.5 INJECTION INTRAVENOUS ONCE
Status: COMPLETED | OUTPATIENT
Start: 2025-08-01 | End: 2025-08-01

## 2025-08-01 RX ADMIN — GADOBUTROL 7.5 ML: 604.72 INJECTION INTRAVENOUS at 14:24

## 2025-08-17 ENCOUNTER — HEALTH MAINTENANCE LETTER (OUTPATIENT)
Age: 65
End: 2025-08-17

## (undated) DEVICE — KIT ENDO TURNOVER/PROCEDURE CARRY-ON 101822

## (undated) DEVICE — SUCTION MANIFOLD NEPTUNE 2 SYS 1 PORT 702-025-000

## (undated) DEVICE — SPECIMEN CONTAINER 3OZ W/FORMALIN 59901

## (undated) DEVICE — GOWN IMPERVIOUS 2XL BLUE

## (undated) DEVICE — TUBING SUCTION MEDI-VAC 1/4"X20' N620A

## (undated) DEVICE — KIT ENDO FIRST STEP DISINFECTANT 200ML W/POUCH EP-4

## (undated) DEVICE — SOL WATER IRRIG 500ML BOTTLE 2F7113

## (undated) DEVICE — SNARE CAPIVATOR ROUND COLD SNR BX10 M00561101

## (undated) DEVICE — ENDO TRAP POLYP E-TRAP 00711099

## (undated) RX ORDER — FENTANYL CITRATE 50 UG/ML
INJECTION, SOLUTION INTRAMUSCULAR; INTRAVENOUS
Status: DISPENSED
Start: 2023-10-09